# Patient Record
Sex: FEMALE | Employment: FULL TIME | URBAN - METROPOLITAN AREA
[De-identification: names, ages, dates, MRNs, and addresses within clinical notes are randomized per-mention and may not be internally consistent; named-entity substitution may affect disease eponyms.]

---

## 2018-05-03 ENCOUNTER — HOSPITAL ENCOUNTER (OUTPATIENT)
Dept: MAMMOGRAPHY | Age: 53
Discharge: HOME | End: 2018-05-03
Attending: OBSTETRICS & GYNECOLOGY
Payer: COMMERCIAL

## 2018-05-03 DIAGNOSIS — Z12.31 VISIT FOR SCREENING MAMMOGRAM: ICD-10-CM

## 2018-05-03 PROCEDURE — 77063 BREAST TOMOSYNTHESIS BI: CPT

## 2020-01-30 ENCOUNTER — APPOINTMENT (OUTPATIENT)
Dept: CT IMAGING | Age: 55
End: 2020-01-30
Attending: EMERGENCY MEDICINE
Payer: COMMERCIAL

## 2020-01-30 ENCOUNTER — HOSPITAL ENCOUNTER (OUTPATIENT)
Age: 55
Setting detail: OBSERVATION
Discharge: HOME | End: 2020-01-31
Attending: EMERGENCY MEDICINE | Admitting: INTERNAL MEDICINE
Payer: COMMERCIAL

## 2020-01-30 ENCOUNTER — APPOINTMENT (OUTPATIENT)
Dept: GENERAL RADIOLOGY | Age: 55
End: 2020-01-30
Attending: NURSE PRACTITIONER
Payer: COMMERCIAL

## 2020-01-30 DIAGNOSIS — S01.01XA LACERATION OF SCALP, INITIAL ENCOUNTER: ICD-10-CM

## 2020-01-30 DIAGNOSIS — S06.6X0A SUBARACHNOID HEMORRHAGE FOLLOWING INJURY, NO LOSS OF CONSCIOUSNESS, INITIAL ENCOUNTER (HCC): Primary | ICD-10-CM

## 2020-01-30 PROBLEM — R00.0 TACHYCARDIA: Status: ACTIVE | Noted: 2020-01-30

## 2020-01-30 PROBLEM — S06.6XAA SUBARACHNOID HEMATOMA: Status: ACTIVE | Noted: 2020-01-30

## 2020-01-30 PROBLEM — F10.10 ALCOHOL ABUSE: Status: ACTIVE | Noted: 2020-01-30

## 2020-01-30 PROBLEM — D72.829 LEUKOCYTOSIS: Status: ACTIVE | Noted: 2020-01-30

## 2020-01-30 PROBLEM — S06.6XAA TRAUMATIC SUBARACHNOID HEMORRHAGE: Status: ACTIVE | Noted: 2020-01-30

## 2020-01-30 LAB
ALBUMIN SERPL-MCNC: 3.8 G/DL (ref 3.4–5)
ALBUMIN SERPL-MCNC: 4.2 G/DL (ref 3.4–5)
ALBUMIN/GLOB SERPL: 1.1 {RATIO} (ref 0.8–1.7)
ALBUMIN/GLOB SERPL: 1.3 {RATIO} (ref 0.8–1.7)
ALP SERPL-CCNC: 82 U/L (ref 45–117)
ALP SERPL-CCNC: 85 U/L (ref 45–117)
ALT SERPL-CCNC: 10 U/L (ref 13–56)
ALT SERPL-CCNC: 13 U/L (ref 13–56)
AMPHET UR QL SCN: NEGATIVE
ANION GAP SERPL CALC-SCNC: 5 MMOL/L (ref 3–18)
ANION GAP SERPL CALC-SCNC: 7 MMOL/L (ref 3–18)
APPEARANCE UR: CLEAR
AST SERPL-CCNC: 18 U/L (ref 10–38)
AST SERPL-CCNC: 20 U/L (ref 10–38)
BARBITURATES UR QL SCN: NEGATIVE
BASOPHILS # BLD: 0.4 K/UL (ref 0–0.06)
BASOPHILS NFR BLD: 2 % (ref 0–3)
BENZODIAZ UR QL: NEGATIVE
BILIRUB SERPL-MCNC: 0.3 MG/DL (ref 0.2–1)
BILIRUB SERPL-MCNC: 0.4 MG/DL (ref 0.2–1)
BILIRUB UR QL: NEGATIVE
BUN SERPL-MCNC: 5 MG/DL (ref 7–18)
BUN SERPL-MCNC: 6 MG/DL (ref 7–18)
BUN/CREAT SERPL: 7 (ref 12–20)
BUN/CREAT SERPL: 8 (ref 12–20)
CALCIUM SERPL-MCNC: 9 MG/DL (ref 8.5–10.1)
CALCIUM SERPL-MCNC: 9.4 MG/DL (ref 8.5–10.1)
CANNABINOIDS UR QL SCN: NEGATIVE
CHLORIDE SERPL-SCNC: 100 MMOL/L (ref 100–111)
CHLORIDE SERPL-SCNC: 109 MMOL/L (ref 100–111)
CO2 SERPL-SCNC: 25 MMOL/L (ref 21–32)
CO2 SERPL-SCNC: 28 MMOL/L (ref 21–32)
COCAINE UR QL SCN: NEGATIVE
COLOR UR: YELLOW
CREAT SERPL-MCNC: 0.69 MG/DL (ref 0.6–1.3)
CREAT SERPL-MCNC: 0.76 MG/DL (ref 0.6–1.3)
DIFFERENTIAL METHOD BLD: ABNORMAL
EOSINOPHIL # BLD: 0 K/UL (ref 0–0.4)
EOSINOPHIL NFR BLD: 0 % (ref 0–5)
ERYTHROCYTE [DISTWIDTH] IN BLOOD BY AUTOMATED COUNT: 13.3 % (ref 11.6–14.5)
EST. AVERAGE GLUCOSE BLD GHB EST-MCNC: 100 MG/DL
ETHANOL SERPL-MCNC: <3 MG/DL (ref 0–3)
GLOBULIN SER CALC-MCNC: 3 G/DL (ref 2–4)
GLOBULIN SER CALC-MCNC: 3.8 G/DL (ref 2–4)
GLUCOSE SERPL-MCNC: 134 MG/DL (ref 74–99)
GLUCOSE SERPL-MCNC: 96 MG/DL (ref 74–99)
GLUCOSE UR STRIP.AUTO-MCNC: NEGATIVE MG/DL
HBA1C MFR BLD: 5.1 % (ref 4.2–5.6)
HCT VFR BLD AUTO: 40.4 % (ref 35–45)
HDSCOM,HDSCOM: NORMAL
HGB BLD-MCNC: 14.2 G/DL (ref 12–16)
HGB UR QL STRIP: NEGATIVE
IRON SATN MFR SERPL: 30 % (ref 20–50)
IRON SERPL-MCNC: 91 UG/DL (ref 50–175)
KETONES UR QL STRIP.AUTO: NEGATIVE MG/DL
LEUKOCYTE ESTERASE UR QL STRIP.AUTO: NEGATIVE
LYMPHOCYTES # BLD: 1.7 K/UL (ref 0.8–3.5)
LYMPHOCYTES NFR BLD: 9 % (ref 20–51)
MCH RBC QN AUTO: 31.6 PG (ref 24–34)
MCHC RBC AUTO-ENTMCNC: 35.1 G/DL (ref 31–37)
MCV RBC AUTO: 90 FL (ref 74–97)
METHADONE UR QL: NEGATIVE
MONOCYTES # BLD: 1.3 K/UL (ref 0–1)
MONOCYTES NFR BLD: 7 % (ref 2–9)
NEUTS BAND NFR BLD MANUAL: 12 % (ref 0–5)
NEUTS SEG # BLD: 15.2 K/UL (ref 1.8–8)
NEUTS SEG NFR BLD: 70 % (ref 42–75)
NITRITE UR QL STRIP.AUTO: NEGATIVE
OPIATES UR QL: NEGATIVE
PCP UR QL: NEGATIVE
PH UR STRIP: 5 [PH] (ref 5–8)
PHOSPHATE SERPL-MCNC: 3.6 MG/DL (ref 2.5–4.9)
PLATELET # BLD AUTO: 401 K/UL (ref 135–420)
PLATELET COMMENTS,PCOM: ABNORMAL
PMV BLD AUTO: 9.3 FL (ref 9.2–11.8)
POTASSIUM SERPL-SCNC: 4.2 MMOL/L (ref 3.5–5.5)
POTASSIUM SERPL-SCNC: 4.3 MMOL/L (ref 3.5–5.5)
PREALB SERPL-MCNC: 32 MG/DL (ref 20–40)
PROCALCITONIN SERPL-MCNC: <0.05 NG/ML
PROT SERPL-MCNC: 6.8 G/DL (ref 6.4–8.2)
PROT SERPL-MCNC: 8 G/DL (ref 6.4–8.2)
PROT UR STRIP-MCNC: NEGATIVE MG/DL
RBC # BLD AUTO: 4.49 M/UL (ref 4.2–5.3)
RBC MORPH BLD: ABNORMAL
SODIUM SERPL-SCNC: 132 MMOL/L (ref 136–145)
SODIUM SERPL-SCNC: 142 MMOL/L (ref 136–145)
SP GR UR REFRACTOMETRY: 1.01 (ref 1–1.03)
T4 FREE SERPL-MCNC: 0.8 NG/DL (ref 0.7–1.5)
TIBC SERPL-MCNC: 304 UG/DL (ref 250–450)
UROBILINOGEN UR QL STRIP.AUTO: 0.2 EU/DL (ref 0.2–1)
VIT B12 SERPL-MCNC: 575 PG/ML (ref 211–911)
WBC # BLD AUTO: 18.6 K/UL (ref 4.6–13.2)

## 2020-01-30 PROCEDURE — 85025 COMPLETE CBC W/AUTO DIFF WBC: CPT

## 2020-01-30 PROCEDURE — 99283 EMERGENCY DEPT VISIT LOW MDM: CPT

## 2020-01-30 PROCEDURE — 82607 VITAMIN B-12: CPT

## 2020-01-30 PROCEDURE — 80053 COMPREHEN METABOLIC PANEL: CPT

## 2020-01-30 PROCEDURE — 74011250637 HC RX REV CODE- 250/637: Performed by: INTERNAL MEDICINE

## 2020-01-30 PROCEDURE — 74011250637 HC RX REV CODE- 250/637: Performed by: EMERGENCY MEDICINE

## 2020-01-30 PROCEDURE — 83540 ASSAY OF IRON: CPT

## 2020-01-30 PROCEDURE — 84100 ASSAY OF PHOSPHORUS: CPT

## 2020-01-30 PROCEDURE — 83036 HEMOGLOBIN GLYCOSYLATED A1C: CPT

## 2020-01-30 PROCEDURE — 99218 HC RM OBSERVATION: CPT

## 2020-01-30 PROCEDURE — 83605 ASSAY OF LACTIC ACID: CPT

## 2020-01-30 PROCEDURE — 75810000293 HC SIMP/SUPERF WND  RPR

## 2020-01-30 PROCEDURE — 74011250636 HC RX REV CODE- 250/636: Performed by: EMERGENCY MEDICINE

## 2020-01-30 PROCEDURE — 90471 IMMUNIZATION ADMIN: CPT

## 2020-01-30 PROCEDURE — 72125 CT NECK SPINE W/O DYE: CPT

## 2020-01-30 PROCEDURE — 74011250637 HC RX REV CODE- 250/637: Performed by: NURSE PRACTITIONER

## 2020-01-30 PROCEDURE — 84439 ASSAY OF FREE THYROXINE: CPT

## 2020-01-30 PROCEDURE — 80074 ACUTE HEPATITIS PANEL: CPT

## 2020-01-30 PROCEDURE — 84145 PROCALCITONIN (PCT): CPT

## 2020-01-30 PROCEDURE — 90715 TDAP VACCINE 7 YRS/> IM: CPT | Performed by: EMERGENCY MEDICINE

## 2020-01-30 PROCEDURE — 71045 X-RAY EXAM CHEST 1 VIEW: CPT

## 2020-01-30 PROCEDURE — 80307 DRUG TEST PRSMV CHEM ANLYZR: CPT

## 2020-01-30 PROCEDURE — 70450 CT HEAD/BRAIN W/O DYE: CPT

## 2020-01-30 PROCEDURE — 84134 ASSAY OF PREALBUMIN: CPT

## 2020-01-30 PROCEDURE — 81003 URINALYSIS AUTO W/O SCOPE: CPT

## 2020-01-30 PROCEDURE — 96374 THER/PROPH/DIAG INJ IV PUSH: CPT

## 2020-01-30 RX ORDER — AMOXICILLIN 250 MG
2 CAPSULE ORAL
Status: DISCONTINUED | OUTPATIENT
Start: 2020-01-30 | End: 2020-01-31 | Stop reason: HOSPADM

## 2020-01-30 RX ORDER — ACETAMINOPHEN 325 MG/1
650 TABLET ORAL
Status: DISCONTINUED | OUTPATIENT
Start: 2020-01-30 | End: 2020-01-30

## 2020-01-30 RX ORDER — LEVETIRACETAM 500 MG/1
500 TABLET ORAL 2 TIMES DAILY
Status: DISCONTINUED | OUTPATIENT
Start: 2020-01-30 | End: 2020-01-31 | Stop reason: HOSPADM

## 2020-01-30 RX ORDER — ONDANSETRON 2 MG/ML
2 INJECTION INTRAMUSCULAR; INTRAVENOUS
Status: DISCONTINUED | OUTPATIENT
Start: 2020-01-30 | End: 2020-01-30

## 2020-01-30 RX ORDER — LEVETIRACETAM 500 MG/1
1000 TABLET ORAL 2 TIMES DAILY
Status: DISCONTINUED | OUTPATIENT
Start: 2020-01-30 | End: 2020-01-30

## 2020-01-30 RX ORDER — ONDANSETRON 2 MG/ML
4 INJECTION INTRAMUSCULAR; INTRAVENOUS
Status: DISCONTINUED | OUTPATIENT
Start: 2020-01-30 | End: 2020-01-31 | Stop reason: HOSPADM

## 2020-01-30 RX ORDER — SIMVASTATIN 20 MG/1
20 TABLET, FILM COATED ORAL
COMMUNITY

## 2020-01-30 RX ORDER — ACETAMINOPHEN 500 MG
1000 TABLET ORAL
Status: DISCONTINUED | OUTPATIENT
Start: 2020-01-30 | End: 2020-01-31 | Stop reason: HOSPADM

## 2020-01-30 RX ORDER — SIMVASTATIN 20 MG/1
20 TABLET, FILM COATED ORAL
Status: DISCONTINUED | OUTPATIENT
Start: 2020-01-30 | End: 2020-01-31 | Stop reason: HOSPADM

## 2020-01-30 RX ORDER — ALBUTEROL SULFATE 0.83 MG/ML
2.5 SOLUTION RESPIRATORY (INHALATION)
Status: DISCONTINUED | OUTPATIENT
Start: 2020-01-30 | End: 2020-01-30

## 2020-01-30 RX ORDER — CEPHALEXIN 250 MG/1
500 CAPSULE ORAL
Status: COMPLETED | OUTPATIENT
Start: 2020-01-30 | End: 2020-01-30

## 2020-01-30 RX ORDER — LEVETIRACETAM 10 MG/ML
1000 INJECTION INTRAVASCULAR ONCE
Status: COMPLETED | OUTPATIENT
Start: 2020-01-30 | End: 2020-01-30

## 2020-01-30 RX ORDER — ACETAMINOPHEN 650 MG/1
650 SUPPOSITORY RECTAL
Status: DISCONTINUED | OUTPATIENT
Start: 2020-01-30 | End: 2020-01-31 | Stop reason: HOSPADM

## 2020-01-30 RX ORDER — LORATADINE 10 MG/1
10 TABLET ORAL
Status: DISCONTINUED | OUTPATIENT
Start: 2020-01-30 | End: 2020-01-31 | Stop reason: HOSPADM

## 2020-01-30 RX ADMIN — ACETAMINOPHEN 650 MG: 325 TABLET ORAL at 14:02

## 2020-01-30 RX ADMIN — ACETAMINOPHEN 1000 MG: 500 TABLET ORAL at 23:35

## 2020-01-30 RX ADMIN — SENNOSIDES AND DOCUSATE SODIUM 2 TABLET: 8.6; 5 TABLET ORAL at 21:15

## 2020-01-30 RX ADMIN — LORATADINE 10 MG: 10 TABLET ORAL at 21:15

## 2020-01-30 RX ADMIN — CEPHALEXIN 500 MG: 250 CAPSULE ORAL at 05:32

## 2020-01-30 RX ADMIN — ACETAMINOPHEN 650 MG: 325 TABLET ORAL at 19:56

## 2020-01-30 RX ADMIN — TETANUS TOXOID, REDUCED DIPHTHERIA TOXOID AND ACELLULAR PERTUSSIS VACCINE, ADSORBED 0.5 ML: 5; 2.5; 8; 8; 2.5 SUSPENSION INTRAMUSCULAR at 02:52

## 2020-01-30 RX ADMIN — SIMVASTATIN 20 MG: 20 TABLET, FILM COATED ORAL at 21:15

## 2020-01-30 RX ADMIN — LEVETIRACETAM 500 MG: 500 TABLET ORAL at 19:56

## 2020-01-30 RX ADMIN — LEVETIRACETAM 1000 MG: 10 INJECTION INTRAVENOUS at 05:32

## 2020-01-30 NOTE — PROGRESS NOTES
In chart to complete REQUIRED DOCUMENTATION  For admission. Patient alert and oriented, however, she continues to fall asleep. Have to keep calling her name to get questions answered. No family in room.

## 2020-01-30 NOTE — CONSULTS
Izabela Sanchez is a 49 y.o., left handed female, with a past medical history of hypercholesterolemia who fell yesterday. Apparently she was walking up the stairs holding her small dog when she felt dizzy lost her balance and the next thing she knows she is in the hospital.  She has little and no recollection of the fall itself. She does not remember getting in the ambulance and being brought to the hospital.  She does remember getting stitches in the emergency room. She is never had a loss or alteration of consciousness before. She currently feels back to her baseline with less headache than she had previously. I have been asked to see her at this time for this. Social History; patient is  lives with her . Drinks a couple beers a day. Smokes less than a pack of cigarettes per day. No illicit drugs. Works as an  for an orthodontist.    Family History; mother  of progressive supranuclear palsy. Father  of lung cancer. Current Facility-Administered Medications   Medication Dose Route Frequency Provider Last Rate Last Dose    simvastatin (ZOCOR) tablet 20 mg  20 mg Oral QHS Aaron Limon MD        acetaminophen (TYLENOL) tablet 650 mg  650 mg Oral Q4H PRN Aaron Limon MD   650 mg at 20 1402    ondansetron [facility]) injection 4 mg  4 mg IntraVENous Q4H PRN Aaron Limon MD        levETIRAcetam (KEPPRA) tablet 500 mg  500 mg Oral BID Aaron Limon MD        acetaminophen (TYLENOL) suppository 650 mg  650 mg Rectal Q4H PRN Jaspal Lewis NP        senna-docusate (PERICOLACE) 8.6-50 mg per tablet 2 Tab  2 Tab Oral QHS Ana Lewis NP         Current Outpatient Medications   Medication Sig Dispense Refill    simvastatin (ZOCOR) 20 mg tablet Take 20 mg by mouth nightly. Past Medical History:   Diagnosis Date    Hypercholesteremia        History reviewed. No pertinent surgical history.     Allergies   Allergen Reactions    Penicillins Unknown (comments)       Patient Active Problem List   Diagnosis Code    Traumatic subarachnoid hemorrhage ([facility] S06.6X9A    Alcohol abuse F10.10    Leukocytosis D72.829    Tachycardia R00.0    Subarachnoid hematoma ([facility] [address] S06.6X9A         Review of Systems:   As above otherwise 11 point review of systems negative including;   Constitutional no fever or chills  Skin denies rash or itching  HENT  Denies tinnitus, hearing lose  Eyes denies diplopia vision lose  Respiratory denies shortness of breath  Cardiovascular denies chest pain, dyspnea on exertion  Gastrointestinal denies nausea, vomiting, diarrhea, constipation  Genitourinary denies incontinence  Musculoskeletal denies joint pain or swelling  Endocrine denies weight change  Hematology denies easy bruising or bleeding   Neurological as above in HPI      PHYSICAL EXAMINATION:      VITAL SIGNS:    Visit Vitals  /69   Pulse 98   Temp 98 °F (36.7 °C)   Resp 14   Wt 68 kg (150 lb)   SpO2 98%   Breastfeeding Unknown       GENERAL: The patient is well developed, well nourished, and in no apparent distress. EXTREMITIES: No clubbing, cyanosis, or edema is identified. Pulses 2+ and symmetrical.  Muscle tone is normal.  HEAD:   Ear, nose, and throat appear to be without trauma. The patient is normocephalic. NEUROLOGIC EXAMINATION    MENTAL STATUS: The patient is awake, alert, and oriented x 4. Fund of knowledge is adequate. Speech is fluent and memory appears to be intact, both long and short term. CRANIAL NERVES: II  Visual fields are full to confrontation. Funduscopic examination reveals flat disks bilaterally. Pupils are both 4 mm and briskly reactive to light and accommodation. III, IV, VI  Extraocular movements are intact and there is no nystagmus. V  Facial sensation is intact to pinprick and light touch. VII  Face is symmetrical.   VIII - Hearing is present. IX, X, [address] rises symmetrically. Gag is present.  Tongue is in the midline. XI - Shoulder shrugging and head turning intact  MOTOR:  The patient is 5/5 in all four limbs without any drift. Fine finger movements are symmetrical.  Isolated motor group testing reveals no focal abnormalities. Tone is normal.  Sensory examination is intact to pinprick, light touch and position sense testing. Reflexes are 2+ and symmetrical. Plantars are down going. Cerebellar examination reveals no gross ataxia or dysmetria. Gait is not tested at this time. Final result (Exam End: 1/30/2020 10:01) Provider Status: Open   Study Result     CT Of The Head Without Contrast     CPT CODE:  21961     CLINICAL HISTORY: Subarachnoid hematoma in a patient who fell down last night     TECHNIQUE: 5 mm helical scan obtained of the head. All CT scans at this  facility are performed using dose optimization techniques as appropriate to a  performed exam, to include automated exposure control, adjustment of the mA  and/or kV according to patient's size (including appropriate matching for site  specific examinations), or use of iterative reconstruction technique.       COMPARISON: Study earlier today at 2:34 AM.     FINDINGS:   Small quantity subarachnoid blood at the vertex is similar in quantity. No  subdural collections. No midline shift, mass effect or abnormal intra-axial fluid collection or  hydrocephalus is seen. Decreased size of scalp hematoma. Skin closure with staples towards the vertex  on the left. The skull is intact. No mass lesion identified. No acute infarction identified.        IMPRESSION  IMPRESSION:      Stable small volume subarachnoid blood near the vertex.     Mild decrease in size of the scalp hematoma. I have reviewed the above imagines myself.        CBC:   Lab Results   Component Value Date/Time    WBC 18.6 (H) 01/30/2020 05:45 AM    RBC 4.49 01/30/2020 05:45 AM    HGB 14.2 01/30/2020 05:45 AM    HCT 40.4 01/30/2020 05:45 AM    PLATELET 694 90/62/2027 05:45 AM     BMP:   Lab Results   Component Value Date/Time    Glucose 134 (H) 01/30/2020 01:47 PM    Sodium 142 01/30/2020 01:47 PM    Potassium 4.3 01/30/2020 01:47 PM    Chloride 109 01/30/2020 01:47 PM    CO2 28 01/30/2020 01:47 PM    BUN 6 (L) 01/30/2020 01:47 PM    Creatinine 0.76 01/30/2020 01:47 PM    Calcium 9.4 01/30/2020 01:47 PM     CMP:   Lab Results   Component Value Date/Time    Glucose 134 (H) 01/30/2020 01:47 PM    Sodium 142 01/30/2020 01:47 PM    Potassium 4.3 01/30/2020 01:47 PM    Chloride 109 01/30/2020 01:47 PM    CO2 28 01/30/2020 01:47 PM    BUN 6 (L) 01/30/2020 01:47 PM    Creatinine 0.76 01/30/2020 01:47 PM    Calcium 9.4 01/30/2020 01:47 PM    Anion gap 5 01/30/2020 01:47 PM    BUN/Creatinine ratio 8 (L) 01/30/2020 01:47 PM    Alk. phosphatase 82 01/30/2020 01:47 PM    Protein, total 6.8 01/30/2020 01:47 PM    Albumin 3.8 01/30/2020 01:47 PM    Globulin 3.0 01/30/2020 01:47 PM    A-G Ratio 1.3 01/30/2020 01:47 PM     Coagulation: No results found for: PTP, INR, APTT, PTTT, INREXT  Cardiac markers: No results found for: CPK, CKND1, MIRIAM       Impression: Fall with loss of consciousness and subarachnoid hemorrhage. Normal examination at this time. Has a little bit of retrograde amnesia. No indication at this was a seizure. No focal findings on her examination at this time. Plan: Will clinically observe overnight and repeat her CAT scan. If no changes in her bleed or improvement, with discharge. No neurologic follow-up is warranted. PLEASE NOTE:   This document has been produced using voice recognition software. Unrecognized errors in transcription may be present.

## 2020-01-30 NOTE — PROGRESS NOTES
And is a 25-year-old  female no significant medical history history of alcohol use admitted with the history of found on the floor after a fall from the stairs after drinking beer and sustaining laceration in the scalp area with the bleeding requiring sutures, also CAT scan suggested patient has subarachnoid hemorrhage.   Patient evaluated by neurology Case discussed with neurology since 2 CAT scan which were done previously were too close to each other we will order a CT head in the morning to reevaluate status of intracranial bleed if stable she can discharge  Continue rest of the medications and management per H&P dictated by my colleague

## 2020-01-30 NOTE — H&P
GENERAL GENERIC H&P/CONSULT    CC: Fall with trauma to head    Subjective:  53yo female presenting for accidental fall involving trauma to head. Patient has no remarkable medical history. Patient was intoxicated when she was going up a flight of stairs at home. It was a witnessed fall with no loss of consciousness or neck pain.  was brought to ED due to bleeding from posterior scalp. CT Head showing contusion of parietal area with SAH      Karina Bowen is a 49 y.o. female with no relevant past medical history who presents with complaint of accidental fall while she started to go up a flight of stairs at home. Her  reports that she had several beers tonight, was intoxicated and fell while she was trying to go up the stairs. He did witness this fall. There is no loss of consciousness, she denies any headache, neck pain or other pain. Her  brought her to the emergency department due to bleeding from her posterior scalp. They are not sure of her tetanus status.       Past Medical History:   Diagnosis Date    Hypercholesteremia       History reviewed. No pertinent surgical history. Prior to Admission medications    Medication Sig Start Date End Date Taking? Authorizing Provider   simvastatin (ZOCOR) 20 mg tablet Take 20 mg by mouth nightly. Yes Other, MD Raina     Allergies   Allergen Reactions    Penicillins Unknown (comments)      Social History     Tobacco Use    Smoking status: Not on file   Substance Use Topics    Alcohol use: Not on file      History reviewed. No pertinent family history. Review of Systems   Constitutional: Negative for activity change. HENT: Negative for congestion. Eyes: Negative for discharge. Respiratory: Negative for apnea and shortness of breath. Cardiovascular: Negative for chest pain. Gastrointestinal: Negative for nausea. Endocrine: Negative for cold intolerance. Genitourinary: Negative for dysuria.    Musculoskeletal: Positive for arthralgias. Negative for gait problem, neck pain and neck stiffness. Skin: Negative for color change. Neurological: Positive for headaches. Negative for dizziness. Hematological: Negative for adenopathy. Psychiatric/Behavioral: Negative for agitation. Objective:    No intake/output data recorded. No intake/output data recorded. Patient Vitals for the past 8 hrs:   BP Temp Pulse Resp SpO2 Weight   01/30/20 0545 124/68  (!) 105 23 98 %    01/30/20 0055 141/89 98 °F (36.7 °C) 98 16 99 % 68 kg (150 lb)     Physical Exam  HENT:      Head: Normocephalic. Comments: Tender swelling noted to the left occipital scalp, blood throughout that portion of hair. Nose: Nose normal.      Mouth/Throat:      Mouth: Mucous membranes are moist.   Eyes:      Pupils: Pupils are equal, round, and reactive to light. Neck:      Musculoskeletal: Normal range of motion. Cardiovascular:      Rate and Rhythm: Tachycardia present. Pulses: Normal pulses. Pulmonary:      Effort: Pulmonary effort is normal.   Abdominal:      General: Bowel sounds are normal.   Genitourinary:     Comments: deferred  Musculoskeletal: Normal range of motion. Skin:     General: Skin is warm. Neurological:      General: No focal deficit present. Mental Status: She is alert. Psychiatric:         Mood and Affect: Mood normal.          Labs:    Recent Results (from the past 24 hour(s))   CBC WITH AUTOMATED DIFF    Collection Time: 01/30/20  5:45 AM   Result Value Ref Range    WBC 18.6 (H) 4.6 - 13.2 K/uL    RBC 4.49 4.20 - 5.30 M/uL    HGB 14.2 12.0 - 16.0 g/dL    HCT 40.4 35.0 - 45.0 %    MCV 90.0 74.0 - 97.0 FL    MCH 31.6 24.0 - 34.0 PG    MCHC 35.1 31.0 - 37.0 g/dL    RDW 13.3 11.6 - 14.5 %    PLATELET 138 106 - 445 K/uL    MPV 9.3 9.2 - 11.8 FL    NEUTROPHILS PENDING %    LYMPHOCYTES PENDING %    MONOCYTES PENDING %    EOSINOPHILS PENDING %    BASOPHILS PENDING %    ABS. NEUTROPHILS PENDING K/UL    ABS.  LYMPHOCYTES PENDING K/UL    ABS. MONOCYTES PENDING K/UL    ABS. EOSINOPHILS PENDING K/UL    ABS. BASOPHILS PENDING K/UL    DF PENDING    METABOLIC PANEL, COMPREHENSIVE    Collection Time: 01/30/20  5:45 AM   Result Value Ref Range    Sodium 132 (L) 136 - 145 mmol/L    Potassium 4.2 3.5 - 5.5 mmol/L    Chloride 100 100 - 111 mmol/L    CO2 25 21 - 32 mmol/L    Anion gap 7 3.0 - 18 mmol/L    Glucose 96 74 - 99 mg/dL    BUN 5 (L) 7.0 - 18 MG/DL    Creatinine 0.69 0.6 - 1.3 MG/DL    BUN/Creatinine ratio 7 (L) 12 - 20      GFR est AA >60 >60 ml/min/1.73m2    GFR est non-AA >60 >60 ml/min/1.73m2    Calcium 9.0 8.5 - 10.1 MG/DL    Bilirubin, total PENDING MG/DL    ALT (SGPT) 13 13 - 56 U/L    AST (SGOT) 20 10 - 38 U/L    Alk. phosphatase PENDING U/L    Protein, total PENDING g/dL    Albumin 4.2 3.4 - 5.0 g/dL    Globulin PENDING g/dL    A-G Ratio PENDING         CT Head:  IMPRESSION:     1. Small acute subarachnoid hemorrhage in the high posterior left frontoparietal  region. Otherwise no evidence of acute intracranial abnormality. 2. Large left high parietal scalp contusion with hematoma without evidence for  calvarial fracture.     Assessment:  Active Problems:    Traumatic subarachnoid hemorrhage ([address] (1/30/2020)      Alcohol abuse (1/30/2020)      Leukocytosis (1/30/2020)      Tachycardia (1/30/2020)        Plan:  Teleneurology has evaluated patient and plans for repeat CT Head later this morning  IF CT head is normal possibly discharge on Keppra BID for 1 week with follow up with PCP  Q4hr neuro checks and monitor for any change in sensorum or mentation  Will check prolactin, procalcitonin and lactic acid--will hold on abx for now  Check alcohol level and UDS    GLOBAL:  Admit to: Med Surg Obs  Cardiac Diet  DVT PPX:will hold given patient has [facility]  Full Code  PT/OT  Tylenol/NSAID for pain  Optional Inpatient Sleep Regimen  Anticipate DC 1-2 days    Signed:  Edy Palacio MD 1/30/2020

## 2020-01-30 NOTE — ED TRIAGE NOTES
Presents awake and alert accompanied by her  who states pt had \"too much to drink tonight and she fell down the steps\"  Pt with dried blood to the back of her head. Pt unable to recall what happen. States she had 4 beers tonight. Denies taking any blood thinners. Pt  states pt was found lying at the bottom of their stairs on a hardwood floor. Unknown loc. Pt  reports they have at least 15 carpeted stairs.

## 2020-01-30 NOTE — PROGRESS NOTES
conducted an Emergency Department consultation and Spiritual Assessment for Solange Ramirez, who is a 49 y.o.,female. Patient's Primary Language is: [place]. According to the patient's EMR Taoist Affiliation is: No Advent. The reason the Patient came to the hospital is:   Patient Active Problem List    Diagnosis Date Noted    Traumatic subarachnoid hemorrhage ([facility] 01/30/2020    Alcohol abuse 01/30/2020    Leukocytosis 01/30/2020    Tachycardia 01/30/2020    Subarachnoid hematoma ([facility] 01/30/2020        The  provided the following Interventions:  Initiated a relationship of care and support. Explored issues of irene, belief, spirituality and Roman Catholic/ritual needs while hospitalized. Listened empathically. Provided chaplaincy education. Provided information about Spiritual Care Services. Offered prayer and assurance of continued prayers on patient's behalf. Chart reviewed. The following outcomes where achieved:  Patient shared limited information about both their medical narrative and spiritual journey/beliefs.  confirmed Patient's Taoist Affiliation. Patient processed feeling about current hospitalization. Patient expressed gratitude for 's visit. Assessment:  Patient does not have any Roman Catholic/cultural needs that will affect patient's preferences in health care. There are no spiritual or Roman Catholic issues which require intervention at this time. Plan:  Chaplains will continue to follow and will provide pastoral care on an as needed/requested basis.  recommends bedside caregivers page  on duty if patient shows signs of acute spiritual or emotional distress.     [id number] [address]   [phone]

## 2020-01-30 NOTE — PROGRESS NOTES
[facility]  Progress Note      Subjective:   Left shoulder pain, denies headache. Unsteady gait while ambulating to bathroom with assistance. Wanting to eat and drink. No difficulty with swallowing. ED notes large hematoma removed to better approximate wound and evaluate depth. 8 staples and purse string suture of 6 cm lesion  Objective:   VS:   Visit Vitals  /69   Pulse 98   Temp 98 °F (36.7 °C)   Resp 14   Wt 68 kg (150 lb)   SpO2 98%   Breastfeeding Unknown          General:  Alert, NAD  Cardiovascular:  RRR  Pulmonary:  LSC throughout; respiratory effort WNL  GI:  +BS in all four quadrants, soft, non-tender  Extremities:  No edema; 2+ dorsalis pedis pulses bilaterally  Neuro: unsteady gait, alert and oriented x3  Left side of scalp injury staples/sutures    Assessment/Plan:   1. Traumatic SAH  2. Leukocytosis  3. Mechanical fall after ETOH use  4. Left shoulder pain s/p fall      Plan  1. CT head repeated @ 1000. Neurology consulted. Continue keppra every 12 hours per teleneuro recommendations until in house neurology recommendations. 2. Urinalysis, UDS normal. Chest xray no acute process  3. Monitor cbc/wbc tomorrow  4. Monitor metabolic panel and renal function  5. PT/OT eval and treat.  Speech eval and treat

## 2020-01-30 NOTE — PROGRESS NOTES
SLP Note:    SLP evaluation order received and attempted; however, pt off the floor. Will re-attempt later this date.      Thank you for this referral,   Joi Richards M.S., [id number] [facility], [place], operated by [facility]  Speech-Language Pathologist

## 2020-01-30 NOTE — ED NOTES
TRANSFER - OUT REPORT:    Verbal report given to Maria Fernanda Nicholson RN(name) on Solange Ramirez  being transferred to [address] [place] [address](unit) for routine progression of care       Report consisted of patients Situation, Background, Assessment and   Recommendations(SBAR). Information from the following report(s) SBAR, ED Summary and MAR was reviewed with the receiving nurse. Lines:   Peripheral IV 01/30/20 Left Antecubital (Active)   Site Assessment Clean, dry, & intact 1/30/2020  5:39 AM   Phlebitis Assessment 0 1/30/2020  5:39 AM   Infiltration Assessment 0 1/30/2020  5:39 AM   Dressing Status Clean, dry, & intact 1/30/2020  5:39 AM   Dressing Type 4 X 4 1/30/2020  5:39 AM        Opportunity for questions and clarification was provided.

## 2020-01-30 NOTE — ED PROVIDER NOTES
EMERGENCY DEPARTMENT HISTORY AND PHYSICAL EXAM    2:32 AM      Date: 1/30/2020  Patient Name: Key Marie    History of Presenting Illness     Chief Complaint   Patient presents with    Fall    Head Injury         History Provided By: Patient and Patient's     Additional History (Context): Key Marie is a 49 y.o. female with no relevant past medical history who presents with complaint of accidental fall while she started to go up a flight of stairs at home. Her  reports that she had several beers tonight, was intoxicated and fell while she was trying to go up the stairs. He did witness this fall. There is no loss of consciousness, she denies any headache, neck pain or other pain. Her  brought her to the emergency department due to bleeding from her posterior scalp. They are not sure of her tetanus status. PCP: None    Current Facility-Administered Medications   Medication Dose Route Frequency Provider Last Rate Last Dose    levETIRAcetam in saline (iso-os) (KEPPRA) infusion 1,000 mg  1,000 mg IntraVENous ONCE Stanford Selby MD   1,000 mg at 01/30/20 0532     Current Outpatient Medications   Medication Sig Dispense Refill    simvastatin (ZOCOR) 20 mg tablet Take 20 mg by mouth nightly. Past History     Past Medical History:  Past Medical History:   Diagnosis Date    Hypercholesteremia        Past Surgical History:  History reviewed. No pertinent surgical history. Family History:  History reviewed. No pertinent family history. Social History:  Social History     Tobacco Use    Smoking status: Not on file   Substance Use Topics    Alcohol use: Not on file    Drug use: Not on file       Allergies: Allergies   Allergen Reactions    Penicillins Unknown (comments)         Review of Systems       Review of Systems   Constitutional: Negative for activity change and appetite change. HENT: Negative for congestion. Eyes: Negative for visual disturbance. Respiratory: Negative for cough and shortness of breath. Cardiovascular: Negative for chest pain. Gastrointestinal: Negative for abdominal pain, diarrhea, nausea and vomiting. Genitourinary: Negative for dysuria. Musculoskeletal: Negative for arthralgias and myalgias. Skin: Negative for rash. As in HPI   Neurological: Negative for weakness and numbness. Physical Exam     Visit Vitals  /89 (BP 1 Location: Right arm, BP Patient Position: At rest)   Pulse 98   Temp 98 °F (36.7 °C)   Resp 16   Wt 68 kg (150 lb)   SpO2 99%         Physical Exam  Vitals signs and nursing note reviewed. Constitutional:       Appearance: She is well-developed. Comments: Patient appears intoxicated   HENT:      Head:      Comments: Tender swelling noted to the left occipital scalp, blood throughout that portion of hair. Eyes:      Conjunctiva/sclera: Conjunctivae normal.   Neck:      Musculoskeletal: Normal range of motion and neck supple. Vascular: No JVD. Comments: No midline cervical spine tenderness  Cardiovascular:      Rate and Rhythm: Normal rate and regular rhythm. Heart sounds: Normal heart sounds. No murmur. Pulmonary:      Effort: Pulmonary effort is normal.      Breath sounds: Normal breath sounds. Abdominal:      General: Bowel sounds are normal. There is no distension. Palpations: Abdomen is soft. Tenderness: There is no abdominal tenderness. Musculoskeletal: Normal range of motion. General: No deformity. Lymphadenopathy:      Cervical: No cervical adenopathy. Skin:     General: Skin is warm and dry. Findings: No rash. Neurological:      Mental Status: She is alert and oriented to person, place, and time. Coordination: Coordination normal.             Diagnostic Study Results     Labs -  No results found for this or any previous visit (from the past 12 hour(s)).     Radiologic Studies -   CT HEAD WO CONT   Final Result   IMPRESSION: 1. Small acute subarachnoid hemorrhage in the high posterior left frontoparietal   region. Otherwise no evidence of acute intracranial abnormality. 2. Large left high parietal scalp contusion with hematoma without evidence for   calvarial fracture. ED physician was informed of the availability of the report at 3:40 AM.      CT SPINE CERV WO CONT   Final Result   IMPRESSION:       1. No evidence of acute fracture. 2. Multilevel degenerative changes as discussed. Grade 1 anterolisthesis C4-5,   most likely degenerative in nature. CT HEAD WO CONT    (Results Pending)         Medical Decision Making   I am the first provider for this patient. I reviewed the vital signs, available nursing notes, past medical history, past surgical history, family history and social history. Vital Signs-Reviewed the patient's vital signs. Records Reviewed: Nursing Notes (Time of Review: 2:32 AM)      Provider Notes (Medical Decision Making):   Jack Mustafa is a 49 y.o. female with no relevant past medical history who presents with complaint of accidental fall while she started to go up a flight of stairs at home. Her  reports that she had several beers tonight, was intoxicated and fell while she was trying to go up the stairs. He did witness this fall. There is no loss of consciousness, she denies any headache, neck pain or other pain. Her  brought her to the emergency department due to bleeding from her posterior scalp. They are not sure of her tetanus status. Swelling to the left occipital scalp, will require cleaning and irrigation to further evaluate for laceration. Differential Diagnosis: Rule out intracranial hemorrhage, skull fracture, cervical spine injury as cannot clear C-spine due to patient's level of intoxication. Low suspicion for other significant injury. Testing: CT brain and C-spine  Treatments:  Will likely require laceration repair    Re-evaluations:  Patient's wound cleaned and irrigated. Large hematoma removed to better approximate wound and evaluate depth. No galea disruption. Irrigated extensively and closed with total of 8 staples in this 6 cm stellate lesion, with final pursestring suture to approximate the final connections. CT neck negative, cervical spine cleared. CT brain shows a very small subarachnoid hemorrhage. Tele-neurology consulted and recommended 1 g of Keppra twice daily for 7 days and 24-hour observation with repeat head CT in 4 to 6 hours. Procedures: Wound Closure by Adhesive  Date/Time: 1/30/2020 5:21 AM  Performed by: Gary Holly MD  Authorized by: Gary Holly MD     Consent:     Consent obtained:  Verbal  Anesthesia (see MAR for exact dosages): Anesthesia method:  Local infiltration    Local anesthetic:  Lidocaine 1% w/o epi  Laceration details:     Location:  Scalp (stellate laceration)    Length (cm):  6  Pre-procedure details:     Preparation:  Patient was prepped and draped in usual sterile fashion and imaging obtained to evaluate for foreign bodies  Exploration:     Hemostasis achieved with:  Direct pressure    Wound exploration: entire depth of wound probed and visualized      Contaminated: no    Treatment:     Area cleansed with:  Saline    Amount of cleaning:  Extensive    Irrigation solution:  Sterile saline    Irrigation method:  Pressure wash    Visualized foreign bodies/material removed: no    Skin repair:     Repair method:  Staples and sutures    Suture size:  3-0    Suture material:  Nylon    Suture technique: single purse string suture. Number of sutures:  1    Number of staples:  8  Approximation:     Approximation:  Close  Post-procedure details:     Dressing:  Open (no dressing)    Patient tolerance of procedure:   Tolerated well, no immediate complications        Critical Care Time: Critical Care Time:  The services I provided to this patient were to treat and/or prevent clinically significant deterioration that could result in the failure of one or more body systems and/or organ systems due to traumatic subarachnoid. Services included the following:  -reviewing nursing notes and old charts  -vital sign assessments  -direct patient care  -medication orders and management  -interpreting and reviewing diagnostic studies/labs  -re-evaluations  -documentation time    Aggregate critical care time was 35 minutes, which includes only time during which I was engaged in work directly related to the patient's care as described above, whether I was at bedside or elsewhere in the Emergency Department. It did not include time spent performing other reported procedures or the services of residents, students, nurses, or advance practice providers. Karyna Prince MD    5:24 AM        Diagnosis     Clinical Impression:   1. Subarachnoid hemorrhage following injury, no loss of consciousness, initial encounter ([facility] [address]    2. Laceration of scalp, initial encounter        Disposition: admit    Follow-up Information    None          Patient's Medications   Start Taking    No medications on file   Continue Taking    SIMVASTATIN (ZOCOR) 20 MG TABLET    Take 20 mg by mouth nightly. These Medications have changed    No medications on file   Stop Taking    No medications on file     _______________________________    Attestations:  Obinna Nuno MD acting as a scribe for and in the presence of Ondina Benites MD      January 30, 2020 at 5:34 AM       Provider Attestation:      I personally performed the services described in the documentation, reviewed the documentation, as recorded by the scribe in my presence, and it accurately and completely records my words and actions.  January 30, 2020 at 5:34 AM - Ondina Benites MD    _______________________________

## 2020-01-30 NOTE — ED NOTES
4:55 AM  Discussed case with tele-neurologist, as Dr. Rose Burns is in a code. Dr. Libertad Schulz will evaluate patient.

## 2020-01-30 NOTE — ROUTINE PROCESS
TRANSFER - IN REPORT: 
 
Verbal report received from [address], RN(name) on Kym Burdick  being received from ED(unit) for routine progression of care Report consisted of patients Situation, Background, Assessment and  
Recommendations(SBAR). Information from the following report(s) SBAR, Kardex and Recent Results was reviewed with the receiving nurse. Opportunity for questions and clarification was provided. Assessment completed upon patients arrival to unit and care assumed.

## 2020-01-31 ENCOUNTER — APPOINTMENT (OUTPATIENT)
Dept: CT IMAGING | Age: 55
End: 2020-01-31
Attending: INTERNAL MEDICINE
Payer: COMMERCIAL

## 2020-01-31 ENCOUNTER — APPOINTMENT (OUTPATIENT)
Dept: GENERAL RADIOLOGY | Age: 55
End: 2020-01-31
Attending: NURSE PRACTITIONER
Payer: COMMERCIAL

## 2020-01-31 VITALS
TEMPERATURE: 97.7 F | HEART RATE: 73 BPM | SYSTOLIC BLOOD PRESSURE: 121 MMHG | DIASTOLIC BLOOD PRESSURE: 63 MMHG | WEIGHT: 144.3 LBS | OXYGEN SATURATION: 100 % | RESPIRATION RATE: 20 BRPM

## 2020-01-31 LAB
ANION GAP SERPL CALC-SCNC: 5 MMOL/L (ref 3–18)
BUN SERPL-MCNC: 11 MG/DL (ref 7–18)
BUN/CREAT SERPL: 14 (ref 12–20)
CA-I SERPL-SCNC: 1.21 MMOL/L (ref 1.12–1.32)
CALCIUM SERPL-MCNC: 9.4 MG/DL (ref 8.5–10.1)
CHLORIDE SERPL-SCNC: 109 MMOL/L (ref 100–111)
CO2 SERPL-SCNC: 29 MMOL/L (ref 21–32)
CREAT SERPL-MCNC: 0.76 MG/DL (ref 0.6–1.3)
ERYTHROCYTE [DISTWIDTH] IN BLOOD BY AUTOMATED COUNT: 13.4 % (ref 11.6–14.5)
GLUCOSE SERPL-MCNC: 98 MG/DL (ref 74–99)
HAV IGM SER QL: NEGATIVE
HBV CORE IGM SER QL: NEGATIVE
HBV SURFACE AG SER QL: <0.1 INDEX
HBV SURFACE AG SER QL: NEGATIVE
HCT VFR BLD AUTO: 36.4 % (ref 35–45)
HCV AB SER IA-ACNC: 0.06 INDEX
HCV AB SERPL QL IA: NEGATIVE
HCV COMMENT,HCGAC: NORMAL
HGB BLD-MCNC: 12.1 G/DL (ref 12–16)
INR PPP: 1 (ref 0.8–1.2)
MAGNESIUM SERPL-MCNC: 2.5 MG/DL (ref 1.6–2.6)
MCH RBC QN AUTO: 30.4 PG (ref 24–34)
MCHC RBC AUTO-ENTMCNC: 33.2 G/DL (ref 31–37)
MCV RBC AUTO: 91.5 FL (ref 74–97)
PHOSPHATE SERPL-MCNC: 4.1 MG/DL (ref 2.5–4.9)
PLATELET # BLD AUTO: 374 K/UL (ref 135–420)
PMV BLD AUTO: 9.7 FL (ref 9.2–11.8)
POTASSIUM SERPL-SCNC: 3.9 MMOL/L (ref 3.5–5.5)
PROLACTIN SERPL-MCNC: 6.4 NG/ML
PROTHROMBIN TIME: 12.6 SEC (ref 11.5–15.2)
RBC # BLD AUTO: 3.98 M/UL (ref 4.2–5.3)
SODIUM SERPL-SCNC: 143 MMOL/L (ref 136–145)
SP1: NORMAL
SP2: NORMAL
SP3: NORMAL
TSH SERPL DL<=0.05 MIU/L-ACNC: 1.5 UIU/ML (ref 0.36–3.74)
WBC # BLD AUTO: 6.4 K/UL (ref 4.6–13.2)

## 2020-01-31 PROCEDURE — 84100 ASSAY OF PHOSPHORUS: CPT

## 2020-01-31 PROCEDURE — 36415 COLL VENOUS BLD VENIPUNCTURE: CPT

## 2020-01-31 PROCEDURE — 92610 EVALUATE SWALLOWING FUNCTION: CPT

## 2020-01-31 PROCEDURE — 74011250637 HC RX REV CODE- 250/637: Performed by: INTERNAL MEDICINE

## 2020-01-31 PROCEDURE — 70450 CT HEAD/BRAIN W/O DYE: CPT

## 2020-01-31 PROCEDURE — 85027 COMPLETE CBC AUTOMATED: CPT

## 2020-01-31 PROCEDURE — 84443 ASSAY THYROID STIM HORMONE: CPT

## 2020-01-31 PROCEDURE — 85610 PROTHROMBIN TIME: CPT

## 2020-01-31 PROCEDURE — 82330 ASSAY OF CALCIUM: CPT

## 2020-01-31 PROCEDURE — 80048 BASIC METABOLIC PNL TOTAL CA: CPT

## 2020-01-31 PROCEDURE — 73020 X-RAY EXAM OF SHOULDER: CPT

## 2020-01-31 PROCEDURE — 83735 ASSAY OF MAGNESIUM: CPT

## 2020-01-31 PROCEDURE — 97165 OT EVAL LOW COMPLEX 30 MIN: CPT

## 2020-01-31 PROCEDURE — 84146 ASSAY OF PROLACTIN: CPT

## 2020-01-31 PROCEDURE — 99218 HC RM OBSERVATION: CPT

## 2020-01-31 PROCEDURE — 97161 PT EVAL LOW COMPLEX 20 MIN: CPT

## 2020-01-31 RX ORDER — CEPHALEXIN 500 MG/1
500 CAPSULE ORAL 3 TIMES DAILY
Qty: 15 CAP | Refills: 0 | Status: SHIPPED | OUTPATIENT
Start: 2020-01-31 | End: 2020-02-05

## 2020-01-31 RX ADMIN — LEVETIRACETAM 500 MG: 500 TABLET ORAL at 10:09

## 2020-01-31 RX ADMIN — ACETAMINOPHEN 1000 MG: 500 TABLET ORAL at 12:18

## 2020-01-31 RX ADMIN — ACETAMINOPHEN 1000 MG: 500 TABLET ORAL at 05:42

## 2020-01-31 NOTE — PROGRESS NOTES
Problem: Self Care Deficits Care Plan (Adult)  Goal: *Acute Goals and Plan of Care (Insert Text)  Outcome: Resolved/Met   OCCUPATIONAL THERAPY EVALUATION/DISCHARGE    Patient: Dahiana Alonso (49 y.o. female)  Date: 1/31/2020  Primary Diagnosis: Traumatic subarachnoid hemorrhage ([facility] [address] [S06.6X9A]  Subarachnoid hematoma ([address] [S06.6X9A]        Precautions:     PLOF: Pt reports she lives with her . Pt was (I) with basic self-care/ADLs and functional mobility without AD PTA. Pt has a handicap accessible bathroom on the 1st floor. ASSESSMENT AND RECOMMENDATIONS:  Pt cleared to participate in OT evaluation by Rn. Upon entering room, pt supine with HOB elevated, alert, and agreeable to therapy session. Based on the objective data described below, the patient presents she is able to perform basic self-care/ADLs Mod(I). Pt c/o pain in L shld. Per medical chart XR, no evidence of acute fracture in L shld. Noted pt is able to perform L shld flex/abd 0-90* and internally rotate L shld to complete ADLs. Pt is aware of her deficits and knows when to take breaks when needed. No LOB noted during functional transfers (ie. To toilet). Educated pt on the role of Ot, evaluation process, and dressing techniques with pt demo good understanding. Pt reports she has a supportive  to assist PRN. Skilled occupational therapy is not indicated at this time. Discharge Recommendations: Outpatient if left shoulder pain persists  Further Equipment Recommendations for Discharge: N/A     SUBJECTIVE:   Patient stated I wasn't able to do this yesterday as she was internally rotating L shld. OBJECTIVE DATA SUMMARY:     Past Medical History:   Diagnosis Date    Hypercholesteremia    History reviewed. No pertinent surgical history.   Barriers to Learning/Limitations: None  Compensate with: visual, verbal, tactile, kinesthetic cues/model    Home Situation:   Home Situation  Home Environment: Private residence  One/Two [place] Residence: Two story  Living Alone: No  Support Systems: Child(francisco), Hinduism / irene community, Family member(s), Friends \ neighbors, Spouse/Significant Other/Partner  Patient Expects to be Discharged to[de-identified] Private residence  Current DME Used/Available at Home: None  []     Right hand dominant   [x]     Left hand dominant    Cognitive/Behavioral Status:  Neurologic State: Alert  Orientation Level: Oriented X4  Cognition: Appropriate decision making; Follows commands  Safety/Judgement: Fall prevention    Skin: Visible skin appeared intact  Edema: None noted    Coordination: BUE  Coordination: Generally decreased, functional  Fine Motor Skills-Upper: Left Intact; Right Intact    Gross Motor Skills-Upper: Left Impaired;Right Intact    Balance:  Sitting: Intact  Standing: Intact    Strength: BUE  Strength: Generally decreased, functional(LUE; RUE WFL)    Tone & Sensation: BUE  Tone: Normal  Sensation: Intact    Range of Motion: BUE  AROM: Generally decreased, functional(LUE; L shld flex/abd 0-90*; RUE WFL)      Functional Mobility and Transfers for ADLs:  Bed Mobility:  Supine to Sit: Modified independent  Sit to Supine: Modified independent  Scooting: Modified independent  Transfers:  Sit to Stand: Independent without AD  Stand to Sit: Independent   Toilet Transfer : Independent   Bathroom Mobility: Independent    ADL Assessment:  Feeding: Modified independent    Oral Facial Hygiene/Grooming: Modified Independent (Decreased AROM of L shld, however pt is still able to complete task Mod[I])    Bathing:  Modified Independent    Upper Body Dressing:  Modified Independent     Lower Body Dressing: Modified Independent     Toileting:  Modified Independent        ADL Intervention:  Educated pt on UB/LB dressing techniques as pt reported pain in L shld.     Lower Body Dressing Assistance  Dressing Assistance: Modified Independent  Slip on Shoes Without Back: Modified Independent    Cognitive Retraining  Safety/Judgement: Fall prevention    Pain:  Pain level pre-treatment: -/10 (Pt reports pain on L shld but did not provide a number on the pain level scale. Pain level post-treatment: -/10   Pain Intervention(s): Medication (see MAR); Rest, Ice, Repositioning  Response to intervention: Nurse notified, See doc flow    Activity Tolerance:   Good    Please refer to the flowsheet for vital signs taken during this treatment. After treatment:   []  Patient left in no apparent distress sitting up in chair  [x]  Patient left in no apparent distress in bed  [x]  Call bell left within reach  [x]  Nursing notified  [x]   present  []  Bed alarm activated    COMMUNICATION/EDUCATION:   [x]      Role of Occupational Therapy in the acute care setting  [x]      Home safety education was provided and the patient/caregiver indicated understanding. [x]      Patient/family have participated as able and agree with findings and recommendations. []      Patient is unable to participate in plan of care at this time. Thank you for this referral.  Angelique Sheffield MS, OTR/L  Time Calculation: 11 mins      Eval Complexity: History: LOW Complexity : Brief history review ; Examination: LOW Complexity : 1-3 performance deficits relating to physical, cognitive , or psychosocial skils that result in activity limitations and / or participation restrictions ;    Decision Making:LOW Complexity : No comorbidities that affect functional and no verbal or physical assistance needed to complete eval tasks

## 2020-01-31 NOTE — PROGRESS NOTES
Problem: Mobility Impaired (Adult and Pediatric)  Goal: *Acute Goals and Plan of Care (Insert Text)  Outcome: Resolved/Met   PHYSICAL THERAPY EVALUATION AND DISCHARGE    Patient: Karina Bowen (49 y.o. female)  Date: 1/31/2020  Primary Diagnosis: Traumatic subarachnoid hemorrhage ([facility] [address] [S06.6X9A]  Subarachnoid hematoma ([facility] [address] [S06.6X9A]    Precautions: Fall    ASSESSMENT :  Patient is 51yo F admitted to hospital for [facility] and presents today alert and agreeable to therapy and was supine in bed upon arrival. Patient transferred to sitting EOB and then stood to ambulate 150ft no device at modified independent. Patient is at baseline for mobility, no LoB or device needed. Patient agreeable to D/C from PT at this time. Patient does not require further skilled intervention at this level of care. PLAN :  Recommendations and Planned Interventions:  No formal PT needs identified at this time. Discharge Recommendations: None  Further Equipment Recommendations for Discharge: N/A     SUBJECTIVE:   Patient stated I'm feeling okay.     OBJECTIVE DATA SUMMARY:     Past Medical History:   Diagnosis Date    Hypercholesteremia    History reviewed. No pertinent surgical history. Barriers to Learning/Limitations: None  Compensate with: N/A  Home Situation:   Home Situation  Home Environment: Private residence  One/Two Story Residence: Two story  Living Alone: No  Support Systems: Child(francisco), Rastafari / irene community, Family member(s), Friends \ neighbors, Spouse/Significant Other/Partner  Patient Expects to be Discharged to[de-identified] Private residence  Current DME Used/Available at Home: None  Critical Behavior:  Neurologic State: Alert  Orientation Level: Oriented X4  Cognition: Appropriate decision making; Follows commands  Safety/Judgement: Fall prevention  Strength:    Strength:  Within functional limits(BLE)   Tone & Sensation:   Tone: Normal(BLE)   Sensation: Intact(BLE)   Range Of Motion:  AROM: Within functional limits(BLE)   Functional Mobility:  Bed Mobility:   Supine to Sit: Modified independent  Sit to Supine: Modified independent  Scooting: Modified independent  Transfers:  Sit to Stand: Independent  Stand to Sit: Independent   Balance:   Sitting: Intact  Standing: Intact  Ambulation/Gait Training:  Distance (ft): 150 Feet (ft)   Ambulation - Level of Assistance: Modified independent   Pain:  Pain level pre-treatment: 0/10   Pain level post-treatment: 0/10    Activity Tolerance:   Patient tolerated activity well despite feeling of mild lightheadedness from [address] Please refer to the flowsheet for vital signs taken during this treatment. After treatment:   []         Patient left in no apparent distress sitting up in chair  [x]         Patient left in no apparent distress in bed  [x]         Call bell left within reach  [x]         Nursing notified  [x]         Caregiver present  []         Bed alarm activated  []         SCDs applied    COMMUNICATION/EDUCATION:   [x]         Role of Physical Therapy in the acute care setting. [x]         Fall prevention education was provided and the patient/caregiver indicated understanding. [x]         Patient/family have participated as able in goal setting and plan of care. [x]         Patient/family agree to work toward stated goals and plan of care. []         Patient understands intent and goals of therapy, but is neutral about his/her participation. []         Patient is unable to participate in goal setting/plan of care: ongoing with therapy staff.  []         Other:     Thank you for this referral.  Natalie Aparicio, PT   Time Calculation: 10 mins      Eval Complexity: History: LOW Complexity : Zero comorbidities / personal factors that will impact the outcome / POCExam:LOW Complexity : 1-2 Standardized tests and measures addressing body structure, function, activity limitation and / or participation in recreation  Presentation: LOW Complexity : Stable, uncomplicated Clinical Decision Making:Low Complexity    Overall Complexity:LOW

## 2020-01-31 NOTE — ROUTINE PROCESS
Bedside and Verbal shift change report given to Xu Ansari RN (oncoming nurse) by Sara Anthony RN (offgoing nurse). Report included the following information SBAR, Kardex and Recent Results.

## 2020-01-31 NOTE — PROGRESS NOTES
Reason for Admission:  Traumatic subarachnoid hemorrhage ([facility] [address] [S06.6X9A]  Subarachnoid hematoma ([facility]) [S06.6X9A]                 RRAT Score:    7%            Plan for utilizing home health:    No                      Likelihood of Readmission:   LOW                         Transition of Care Plan:              Initial assessment completed with patient and spouse/SO, Mitch Bryant [phone]. . Cognitive status of patient: oriented to time, place, person and situation. Face sheet information confirmed:  yes. The patient designates her   Mitch Bryant [phone] to participate in her discharge plan and to receive any needed information. This patient lives in a single family home with . Patient is able to navigate steps as needed. Prior to hospitalization, patient was considered to be independent with ADLs/IADLS : yes . Patient has a current ACP document on file: no     The patient's  will be available to transport patient home upon discharge. The patient already has none reported,  medical equipment available in the home. Patient is not currently active with home health. Patient has not stayed in a skilled nursing facility or rehab. This patient is on dialysis :no    Currently, the discharge plan is Home. The patient states that she can obtain her medications from the pharmacy, and take her medications as directed. Patient's current insurance is [employer]. Care Management Interventions  PCP Verified by CM: Yes(Patient prefers to go to [facility].  Patient and  declined PCP list.)  Mode of Transport at Discharge: Self  Transition of Care Consult (CM Consult): Discharge Planning  Discharge Durable Medical Equipment: No  Physical Therapy Consult: No  Occupational Therapy Consult: Yes  Speech Therapy Consult: Yes  Current Support Network: Lives with Spouse  Confirm Follow Up Transport: Family  Discharge Location  Discharge Placement: [address] RN  Case Management [phone]

## 2020-01-31 NOTE — PROGRESS NOTES
Problem: Dysphagia (Adult)  Goal: *Acute Goals and Plan of Care (Insert Text)  Description  Dysphagia Present:   No    Recommendations:  Diet: regular/ thin  Meds: as tolerated    Patient will:  1. Participate in training and education related to continued aspiration risk, diet recs and compensatory strategies (goal met). Outcome: Resolved/Met    SPEECH LANGUAGE PATHOLOGY BEDSIDE SWALLOW EVALUATION AND DISCHARGE    Patient: Ruchi Kemp (49 y.o. female)  Date: 1/31/2020  Primary Diagnosis: Traumatic subarachnoid hemorrhage ([facility] [address] [S06.6X9A]  Subarachnoid hematoma ([facility] [address] [S06.6X9A]        Precautions:      PLOF: as per H&P    ASSESSMENT :  Clinical beside swallow eval completed per MD orders. Pt A&Ox4. Functional communication. Intelligibility >90%. Cognitive-linguistic function appears intact. OM examination revealed oral motor structures functional for mastication and deglutition. Presented with thin liquid, puree, and solid trials. Exhibited functional bolus cohesion, manipulation and A-P transit. Further exhibited functional swallow timing/reflex and hyolaryngeal excursion. Pt able to manipulate and clear with 0 clinical s/s aspiration and/or oropharyngeal dysphagia. Pt safe for regular solid, thin liquid diet. 0 formal ST needs for dysphagia indicated at this time. SLP educated pt on role of speech therapist in current setting with re: speech/swallow; verbalized comprehension. SLP available for re-evaluation if indicated by MD. Results d/w RN, Jorge Owens. Thank you for this referral.   Barron Alejandra, MS, CCC/SLP     PLAN :  Recommendations and Planned Interventions:  No formal ST needs ID'd for dysphagia. Eval only. Discharge Recommendations: None     SUBJECTIVE:   Patient stated The only issue I have is when I go from laying down to sitting up, I get dizzy.     OBJECTIVE:     Past Medical History:   Diagnosis Date    Hypercholesteremia    History reviewed. No pertinent surgical history.   Home Situation:   Home Situation  Home Environment: Private residence  One/Two Story Residence: Two story  Living Alone: No  Support Systems: Child(francisco), [employer] / [place] community, Family member(s), Friends \ neighbors, Spouse/Significant Other/Partner  Patient Expects to be Discharged to[de-identified] Private residence  Current DME Used/Available at Home: None    Diet prior to admission: regular/ thin  Current Diet:  regular/ thin     Cognitive and Communication Status:  Neurologic State: Alert  Orientation Level: Oriented X4, Appropriate for age  Cognition: Appropriate decision making, Appropriate for age attention/concentration, Appropriate safety awareness, Follows commands  Perception: Appears intact  Perseveration: No perseveration noted  Safety/Judgement: Awareness of environment, Good awareness of safety precautions, Fall prevention  Oral Assessment:  Oral Assessment  Labial: No impairment  Dentition: Natural;Intact; Full  Oral Hygiene: wfl  Lingual: No impairment  Velum: No impairment  Mandible: No impairment  Gag Reflex: No impairment  P.O. Trials:  Patient Position: HOB 35*  Vocal quality prior to P.O.: No impairment  Consistency Presented: Thin liquid; Solid  How Presented: Self-fed/presented;Straw;Successive swallows     Bolus Acceptance: No impairment  Bolus Formation/Control: No impairment     Propulsion: No impairment  Oral Residue: None  Initiation of Swallow: No impairment  Laryngeal Elevation: Functional  Aspiration Signs/Symptoms: None  Pharyngeal Phase Characteristics: No impairment, issues, or problems   Effective Modifications: None  Cues for Modifications: None       Oral Phase Severity: No impairment  Pharyngeal Phase Severity : No impairment    PAIN:  Pain level pre-treatment: 0/10   Pain level post-treatment: 0/10   Pain Intervention(s): Medication (see MAR);  Rest, Ice, Repositioning   Response to intervention: Nurse notified, See doc flow    After evaluation:   []            Patient left in no apparent distress sitting up in chair  [x]            Patient left in no apparent distress in bed  [x]            Call bell left within reach  [x]            Nursing notified  [x]            Family present  []            Caregiver present  []            Bed alarm activated      COMMUNICATION/EDUCATION:   [x]            Aspiration precautions; swallow safety; compensatory techniques. [x]            Patient/family have participated as able in goal setting and plan of care. []            Patient/family agree to work toward stated goals and plan of care. []            Patient understands intent and goals of therapy; neutral about participation. []            Patient unable to participate in goal setting/plan of care; educ ongoing with interdisciplinary staff  []         Posted safety precautions in patient's room.     Thank you for this referral.  Donal Perez MS, CCC/SLP  Time Calculation: 18 mins

## 2020-01-31 NOTE — PROGRESS NOTES
D/C order noted for today. Orders reviewed. No needs identified at this time. CM remains available if needed. Patient nurse, Sae Pedroza RN aware, no needs from CM at this time.     Mathieu Enriquez -[phone]

## 2020-01-31 NOTE — ROUTINE PROCESS
Bedside shift change report given to [employer] (oncoming nurse) by Ed Sonam (offgoing nurse). Report included the following information SBAR, Kardex, Intake/Output, MAR and Recent Results.